# Patient Record
Sex: MALE | Race: WHITE | NOT HISPANIC OR LATINO | URBAN - METROPOLITAN AREA
[De-identification: names, ages, dates, MRNs, and addresses within clinical notes are randomized per-mention and may not be internally consistent; named-entity substitution may affect disease eponyms.]

---

## 2024-06-12 ENCOUNTER — INPATIENT (INPATIENT)
Facility: HOSPITAL | Age: 69
LOS: 0 days | Discharge: HOME | DRG: 252 | End: 2024-06-13
Attending: INTERNAL MEDICINE | Admitting: HOSPITALIST
Payer: MEDICARE

## 2024-06-12 VITALS
SYSTOLIC BLOOD PRESSURE: 194 MMHG | RESPIRATION RATE: 18 BRPM | TEMPERATURE: 99 F | OXYGEN SATURATION: 100 % | WEIGHT: 222.67 LBS | HEART RATE: 59 BPM | DIASTOLIC BLOOD PRESSURE: 84 MMHG

## 2024-06-12 DIAGNOSIS — T82.9XXA UNSPECIFIED COMPLICATION OF CARDIAC AND VASCULAR PROSTHETIC DEVICE, IMPLANT AND GRAFT, INITIAL ENCOUNTER: ICD-10-CM

## 2024-06-12 LAB
ALBUMIN SERPL ELPH-MCNC: 4 G/DL — SIGNIFICANT CHANGE UP (ref 3.5–5.2)
ALP SERPL-CCNC: 76 U/L — SIGNIFICANT CHANGE UP (ref 30–115)
ALT FLD-CCNC: 9 U/L — SIGNIFICANT CHANGE UP (ref 0–41)
ANION GAP SERPL CALC-SCNC: 16 MMOL/L — HIGH (ref 7–14)
APTT BLD: 31.3 SEC — SIGNIFICANT CHANGE UP (ref 27–39.2)
AST SERPL-CCNC: 14 U/L — SIGNIFICANT CHANGE UP (ref 0–41)
BASOPHILS # BLD AUTO: 0.06 K/UL — SIGNIFICANT CHANGE UP (ref 0–0.2)
BASOPHILS NFR BLD AUTO: 1.2 % — HIGH (ref 0–1)
BILIRUB SERPL-MCNC: 0.7 MG/DL — SIGNIFICANT CHANGE UP (ref 0.2–1.2)
BLD GP AB SCN SERPL QL: SIGNIFICANT CHANGE UP
BUN SERPL-MCNC: 68 MG/DL — CRITICAL HIGH (ref 10–20)
CALCIUM SERPL-MCNC: 8.5 MG/DL — SIGNIFICANT CHANGE UP (ref 8.4–10.4)
CHLORIDE SERPL-SCNC: 98 MMOL/L — SIGNIFICANT CHANGE UP (ref 98–110)
CO2 SERPL-SCNC: 28 MMOL/L — SIGNIFICANT CHANGE UP (ref 17–32)
CREAT SERPL-MCNC: 10 MG/DL — CRITICAL HIGH (ref 0.7–1.5)
EGFR: 5 ML/MIN/1.73M2 — LOW
EOSINOPHIL # BLD AUTO: 0.11 K/UL — SIGNIFICANT CHANGE UP (ref 0–0.7)
EOSINOPHIL NFR BLD AUTO: 2.2 % — SIGNIFICANT CHANGE UP (ref 0–8)
GLUCOSE SERPL-MCNC: 80 MG/DL — SIGNIFICANT CHANGE UP (ref 70–99)
HCT VFR BLD CALC: 30.6 % — LOW (ref 42–52)
HGB BLD-MCNC: 10.1 G/DL — LOW (ref 14–18)
IMM GRANULOCYTES NFR BLD AUTO: 0.2 % — SIGNIFICANT CHANGE UP (ref 0.1–0.3)
INR BLD: 1.01 RATIO — SIGNIFICANT CHANGE UP (ref 0.65–1.3)
LYMPHOCYTES # BLD AUTO: 1.07 K/UL — LOW (ref 1.2–3.4)
LYMPHOCYTES # BLD AUTO: 21.9 % — SIGNIFICANT CHANGE UP (ref 20.5–51.1)
MCHC RBC-ENTMCNC: 32.9 PG — HIGH (ref 27–31)
MCHC RBC-ENTMCNC: 33 G/DL — SIGNIFICANT CHANGE UP (ref 32–37)
MCV RBC AUTO: 99.7 FL — HIGH (ref 80–94)
MONOCYTES # BLD AUTO: 0.34 K/UL — SIGNIFICANT CHANGE UP (ref 0.1–0.6)
MONOCYTES NFR BLD AUTO: 7 % — SIGNIFICANT CHANGE UP (ref 1.7–9.3)
NEUTROPHILS # BLD AUTO: 3.3 K/UL — SIGNIFICANT CHANGE UP (ref 1.4–6.5)
NEUTROPHILS NFR BLD AUTO: 67.5 % — SIGNIFICANT CHANGE UP (ref 42.2–75.2)
NRBC # BLD: 0 /100 WBCS — SIGNIFICANT CHANGE UP (ref 0–0)
PLATELET # BLD AUTO: 180 K/UL — SIGNIFICANT CHANGE UP (ref 130–400)
PMV BLD: 9.8 FL — SIGNIFICANT CHANGE UP (ref 7.4–10.4)
POTASSIUM SERPL-MCNC: 4.6 MMOL/L — SIGNIFICANT CHANGE UP (ref 3.5–5)
POTASSIUM SERPL-SCNC: 4.6 MMOL/L — SIGNIFICANT CHANGE UP (ref 3.5–5)
PROT SERPL-MCNC: 7.1 G/DL — SIGNIFICANT CHANGE UP (ref 6–8)
PROTHROM AB SERPL-ACNC: 11.5 SEC — SIGNIFICANT CHANGE UP (ref 9.95–12.87)
RBC # BLD: 3.07 M/UL — LOW (ref 4.7–6.1)
RBC # FLD: 13.8 % — SIGNIFICANT CHANGE UP (ref 11.5–14.5)
SODIUM SERPL-SCNC: 142 MMOL/L — SIGNIFICANT CHANGE UP (ref 135–146)
WBC # BLD: 4.89 K/UL — SIGNIFICANT CHANGE UP (ref 4.8–10.8)
WBC # FLD AUTO: 4.89 K/UL — SIGNIFICANT CHANGE UP (ref 4.8–10.8)

## 2024-06-12 PROCEDURE — 82728 ASSAY OF FERRITIN: CPT

## 2024-06-12 PROCEDURE — 36901 INTRO CATH DIALYSIS CIRCUIT: CPT

## 2024-06-12 PROCEDURE — 83540 ASSAY OF IRON: CPT

## 2024-06-12 PROCEDURE — 36907 BALO ANGIOP CTR DIALYSIS SEG: CPT

## 2024-06-12 PROCEDURE — 76937 US GUIDE VASCULAR ACCESS: CPT | Mod: 26

## 2024-06-12 PROCEDURE — 82607 VITAMIN B-12: CPT

## 2024-06-12 PROCEDURE — 76937 US GUIDE VASCULAR ACCESS: CPT

## 2024-06-12 PROCEDURE — 90935 HEMODIALYSIS ONE EVALUATION: CPT

## 2024-06-12 PROCEDURE — 99285 EMERGENCY DEPT VISIT HI MDM: CPT

## 2024-06-12 PROCEDURE — 36415 COLL VENOUS BLD VENIPUNCTURE: CPT

## 2024-06-12 PROCEDURE — 80053 COMPREHEN METABOLIC PANEL: CPT

## 2024-06-12 PROCEDURE — 83735 ASSAY OF MAGNESIUM: CPT

## 2024-06-12 PROCEDURE — 93931 UPPER EXTREMITY STUDY: CPT | Mod: 26,LT

## 2024-06-12 PROCEDURE — 93971 EXTREMITY STUDY: CPT | Mod: 26,LT

## 2024-06-12 PROCEDURE — 83550 IRON BINDING TEST: CPT

## 2024-06-12 PROCEDURE — 84100 ASSAY OF PHOSPHORUS: CPT

## 2024-06-12 PROCEDURE — 85027 COMPLETE CBC AUTOMATED: CPT

## 2024-06-12 PROCEDURE — 99223 1ST HOSP IP/OBS HIGH 75: CPT

## 2024-06-12 PROCEDURE — 82746 ASSAY OF FOLIC ACID SERUM: CPT

## 2024-06-12 RX ORDER — LANOLIN ALCOHOL/MO/W.PET/CERES
3 CREAM (GRAM) TOPICAL AT BEDTIME
Refills: 0 | Status: DISCONTINUED | OUTPATIENT
Start: 2024-06-12 | End: 2024-06-13

## 2024-06-12 RX ORDER — CALCIUM ACETATE 667 MG
3 TABLET ORAL
Refills: 0 | DISCHARGE

## 2024-06-12 RX ORDER — ONDANSETRON 8 MG/1
4 TABLET, FILM COATED ORAL EVERY 8 HOURS
Refills: 0 | Status: DISCONTINUED | OUTPATIENT
Start: 2024-06-12 | End: 2024-06-13

## 2024-06-12 RX ORDER — HEPARIN SODIUM 5000 [USP'U]/ML
5000 INJECTION INTRAVENOUS; SUBCUTANEOUS EVERY 12 HOURS
Refills: 0 | Status: DISCONTINUED | OUTPATIENT
Start: 2024-06-12 | End: 2024-06-13

## 2024-06-12 RX ORDER — CALCIUM ACETATE 667 MG
667 TABLET ORAL
Refills: 0 | Status: DISCONTINUED | OUTPATIENT
Start: 2024-06-12 | End: 2024-06-13

## 2024-06-12 RX ORDER — LISINOPRIL 2.5 MG/1
5 TABLET ORAL DAILY
Refills: 0 | Status: DISCONTINUED | OUTPATIENT
Start: 2024-06-12 | End: 2024-06-13

## 2024-06-12 RX ORDER — ACETAMINOPHEN 500 MG
650 TABLET ORAL EVERY 6 HOURS
Refills: 0 | Status: DISCONTINUED | OUTPATIENT
Start: 2024-06-12 | End: 2024-06-13

## 2024-06-12 RX ORDER — PANTOPRAZOLE SODIUM 20 MG/1
40 TABLET, DELAYED RELEASE ORAL
Refills: 0 | Status: DISCONTINUED | OUTPATIENT
Start: 2024-06-12 | End: 2024-06-13

## 2024-06-12 RX ORDER — LISINOPRIL 2.5 MG/1
1 TABLET ORAL
Refills: 0 | DISCHARGE

## 2024-06-12 RX ADMIN — LISINOPRIL 5 MILLIGRAM(S): 2.5 TABLET ORAL at 21:48

## 2024-06-12 NOTE — ED PROVIDER NOTE - OBJECTIVE STATEMENT
67 yo male with a pmh of htn and esrd on HD MorrisKeely presents c/o L arm edema for the past week. pt states his last dialysis was on Saturday because they refused to perform HD yesterday due to the swelling. pt denies any other symptoms including fevers, chill, headache, recent illness/travel, cough, abdominal pain, chest pain, or SOB.

## 2024-06-12 NOTE — ED ADULT NURSE REASSESSMENT NOTE - NS ED NURSE REASSESS COMMENT FT1
Received patient from AM RN at 1900, comfort measures maintained, IV intact and patent, Call bell within reach. Patient pending transport to 3B/ 6B.

## 2024-06-12 NOTE — CHART NOTE - NSCHARTNOTEFT_GEN_A_CORE
PACU ANESTHESIA ADMISSION NOTE      Procedure: Fistulogram  Post op diagnosis:  AV Fistulogram    __x__  Patent Airway    __x__  Full return of protective reflexes    __x__  Full recovery from anesthesia / back to baseline status    Vitals:  T(C): 37.1 (06-12-24 @ 14:13), Max: 37.1 (06-12-24 @ 10:50)  HR: 59 (06-12-24 @ 14:13) (53 - 59)  BP: 194/84 (06-12-24 @ 14:13) (194/84 - 204/86)  RR: 18 (06-12-24 @ 14:13) (18 - 20)  SpO2: 100% (06-12-24 @ 14:13) (100% - 100%)    Mental Status:  __x__ Awake   ___x__ Alert   _____ Drowsy   _____ Sedated    Nausea/Vomiting:  __x__ NO  ______Yes,   See Post - Op Orders          Pain Scale (0-10):  ___0__    Treatment: ____ None    __x__ See Post - Op/PCA Orders    Post - Operative Fluids:   ____ Oral   __x__ See Post - Op Orders    Plan: Discharge:   __x__Home       _____Floor     _____Critical Care    _____  Other:_________________    Comments: Patient had smooth intraoperative event, no anesthesia complication.  PACU Vital signs: HR: 57            BP:        156/72          RR: 16            O2 Sat:       100%

## 2024-06-12 NOTE — PROCEDURE NOTE - PROCEDURE FINDINGS AND DETAILS
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 Successfully treated with 10 mm balloon angioplasty

## 2024-06-12 NOTE — ED PROVIDER NOTE - ATTENDING APP SHARED VISIT CONTRIBUTION OF CARE
68-year-old male with dialysis had fistula about 5 years ago at outside hospital who is presenting with gradual onset 6-day history of left arm swelling which started in the hand and now has progressed to the whole arm.  He has been able to get dialysis last Thursday and Saturday but was unable to get dialysis on Tuesday was referred here after his dialysis center consult to Dr. Oneal.  On exam there is a palpable thrill the pulses are intact cap refill is normal the arm is swollen to touch sensation is intact plan is to obtain vascular ultrasound.

## 2024-06-12 NOTE — H&P ADULT - ASSESSMENT
A 68Y/M with Pmhx of HTN, ESRD on HD(TTS, last HD on Saturday) presented to the ED for the evaluation of  L arm edema for 1 week. The swelling started in the hand and now has progressed to the entire Left UE. The patient missed his HD session on Tuesday due to the edema. The patient was referred to the ED after the dialysis center consulted Dr. Oneal The patient had fistula creation about 5 years ago.       #ESRD on HD(TTS)  #Macrocytic Anemia  -Last HD on Saturday  -Hb 10.1, MCV 99.7, BUN 68, Cr 10  -VA Duplex Upper Ext Vein Scan, Left (06.12.24 @ 14:06): No evidence of left upper extremity deep venous thrombosis. Negative for superficial thrombophlebitis. Left upper extremity AV fistula noted.  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     Plan:  -AV fistula is ready for immediate use as per IR  -Nephro consult  -Phosphorus Level in AM  -Anemia work up    #Hypertensive Urgency in the setting of missed HD session  #H/O HTN  -BP at presentation 194/84mmhg, repeat 156/72mmhg  -Will continue with   -Monitor BP      #MISC:  -GI ppx: PPI  -DVT ppx: Heparin sq  -Diet: DASH/Renal  -Activity: IAT  -Dispo: Admit to medicine, Nephro consult         A 68Y/M with Pmhx of HTN, ESRD on HD(TTS, last HD on Saturday) presented to the ED for the evaluation of  L arm edema for 1 week. The swelling started in the hand and now has progressed to the entire Left UE. The patient missed his HD session on Tuesday due to the edema. The patient was referred to the ED after the dialysis center consulted Dr. Oneal .The patient had fistula creation about 5 years ago.       #ESRD on HD(TTS)  #Macrocytic Anemia  -Last HD on Saturday  -still makes small amount of urine   -Hb 10.1, MCV 99.7, BUN 68, Cr 10  -VA Duplex Upper Ext Vein Scan, Left (06.12.24 @ 14:06): No evidence of left upper extremity deep venous thrombosis. Negative for superficial thrombophlebitis. Left upper extremity AV fistula noted.  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     Plan:  -AV fistula is ready for immediate use as per IR  -Nephro consult(Dr. Snyder)  -Phosphorus Level in AM  -Anemia work up    #Hypertensive Urgency in the setting of missed HD session  #H/O HTN  -BP at presentation 194/84mmhg, repeat 156/72mmhg  -Will continue with home dose Lisinopril 5mg   -Monitor BP      #MISC:  -GI ppx: PPI  -DVT ppx: Heparin sq  -Diet: DASH/Renal  -Activity: IAT  -Dispo: Admit to medicine, Nephro consult

## 2024-06-12 NOTE — H&P ADULT - NSHPLABSRESULTS_GEN_ALL_CORE
LABS:                         10.1   4.89  )-----------( 180      ( 12 Jun 2024 12:36 )             30.6     06-12    142  |  98  |  68<HH>  ----------------------------<  80  4.6   |  28  |  10.0<HH>    Ca    8.5      12 Jun 2024 12:36    TPro  7.1  /  Alb  4.0  /  TBili  0.7  /  DBili  x   /  AST  14  /  ALT  9   /  AlkPhos  76  06-12    PT/INR - ( 12 Jun 2024 12:36 )   PT: 11.50 sec;   INR: 1.01 ratio         PTT - ( 12 Jun 2024 12:36 )  PTT:31.3 sec  Urinalysis Basic - ( 12 Jun 2024 12:36 )    Color: x / Appearance: x / SG: x / pH: x  Gluc: 80 mg/dL / Ketone: x  / Bili: x / Urobili: x   Blood: x / Protein: x / Nitrite: x   Leuk Esterase: x / RBC: x / WBC x   Sq Epi: x / Non Sq Epi: x / Bacteria: x                RADIOLOGY, EKG & ADDITIONAL TESTS: Reviewed.

## 2024-06-12 NOTE — H&P ADULT - ATTENDING COMMENTS
68Y/M with Pmhx of HTN, ESRD on HD(TTS, last HD on Saturday) presented to the ED for the evaluation of  L arm edema for 1 week. The swelling started in the hand and now has progressed to the entire Left UE. The patient missed his HD session on Tuesday due to the edema. The patient was referred to the ED after the dialysis center consulted Dr. Oneal .The patient had fistula creation about 5 years ago.     Agree  with assessment  except for changes below.   Vital Signs Last 24 Hrs  T(C): 36.1 (12 Jun 2024 16:00), Max: 37.1 (12 Jun 2024 10:50)  T(F): 97 (12 Jun 2024 16:00), Max: 98.7 (12 Jun 2024 10:50)  HR: 58 (12 Jun 2024 16:40) (52 - 59)  BP: 132/70 (12 Jun 2024 16:40) (132/70 - 204/86)  BP(mean): --  RR: 18 (12 Jun 2024 16:40) (18 - 20)  SpO2: 99% (12 Jun 2024 16:40) (99% - 100%)    Parameters below as of 12 Jun 2024 16:40  Patient On (Oxygen Delivery Method): room air      VA Duplex Upper Ext Vein Scan, Left (06.12.24 @ 14:06): No evidence of left upper extremity deep venous thrombosis. Negative for superficial thrombophlebitis. Left upper extremity AV fistula noted.    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      AV fistula is ready for immediate use as per IR  -Nephro consult(Dr. Snyder)  -Phosphorus Level in AM  -f/u Anemia work up    Hypertensive Urgency in the setting of missed HD session- Improved   Hx  HTN  -BP at presentation 194/84mmhg, repeat 156/72mmhg  -Continue Lisinopril 5mg   -Monitor BP    Seen on 06/12

## 2024-06-12 NOTE — ED ADULT NURSE REASSESSMENT NOTE - NS ED NURSE REASSESS COMMENT FT1
pt was sent to Vascular @ [id number] , contacted vascular US to get update on pt Vasc Tech stated that pt was picked up by IR MD to go to IR, advised MD Ruvalcaba and RAJNI Mari

## 2024-06-12 NOTE — PATIENT PROFILE ADULT - FALL HARM RISK - HARM RISK INTERVENTIONS

## 2024-06-12 NOTE — H&P ADULT - HISTORY OF PRESENT ILLNESS
A 68Y/M with Pmhx of HTN, ESRD on HD(TTS, last HD on Saturday) presented to the ED for the evaluation of  L arm edema for 1 week. The swelling started in the hand and now has progressed to the entire Left UE. The patient missed his HD session on Tuesday due to the edema. The patient was referred to the ED after the dialysis center consulted Dr. Oneal The patient had fistula creation about 5 years ago. Denied fevers, chill, headache, cough, SOB, recent illness, recent travel, abd pain, dysuria, N/V/D, sick contacts, dizziness, headache or LOC.    #ED Vitals:  T(C): 36.1 (06-12-24 @ 16:00), Max: 37.1 (06-12-24 @ 10:50)  HR: 58 (06-12-24 @ 16:40) (52 - 59)  BP: 132/70 (06-12-24 @ 16:40) (132/70 - 204/86)  RR: 18 (06-12-24 @ 16:40) (18 - 20)  SpO2: 99% (06-12-24 @ 16:40) (99% - 100%)    #Labs: Hb 10.1, MCV 99.7, BUN 68, Cr 10    #Imaging:  VA Duplex Upper Ext Vein Scan, Left (06.12.24 @ 14:06):   No evidence of left upper extremity deep venous thrombosis. Negative for superficial thrombophlebitis. Left upper extremity AV fistula noted.    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     Admitted for further management.        A 68Y/M with Pmhx of HTN, ESRD on HD(TTS, last HD on Saturday) presented to the ED for the evaluation of  L arm edema for 1 week. The swelling started in the hand and now has progressed to the entire Left UE. The patient missed his HD session on Tuesday due to the edema. The patient was referred to the ED after the dialysis center consulted Dr. Oneal. The patient had fistula creation about 5 years ago. Denied fevers, chill, headache, cough, SOB, recent illness, recent travel, abd pain, dysuria, N/V/D, sick contacts, dizziness, headache or LOC.    #ED Vitals:  T(C): 36.1 (06-12-24 @ 16:00), Max: 37.1 (06-12-24 @ 10:50)  HR: 58 (06-12-24 @ 16:40) (52 - 59)  BP: 132/70 (06-12-24 @ 16:40) (132/70 - 204/86)  RR: 18 (06-12-24 @ 16:40) (18 - 20)  SpO2: 99% (06-12-24 @ 16:40) (99% - 100%)    #Labs: Hb 10.1, MCV 99.7, BUN 68, Cr 10    #Imaging:  VA Duplex Upper Ext Vein Scan, Left (06.12.24 @ 14:06):   No evidence of left upper extremity deep venous thrombosis. Negative for superficial thrombophlebitis. Left upper extremity AV fistula noted.    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     Admitted for further management.

## 2024-06-12 NOTE — ED ADULT NURSE NOTE - NSFALLUNIVINTERV_ED_ALL_ED
Bed/Stretcher in lowest position, wheels locked, appropriate side rails in place/Call bell, personal items and telephone in reach/Instruct patient to call for assistance before getting out of bed/chair/stretcher/Non-slip footwear applied when patient is off stretcher/[place] to call system/Physically safe environment - no spills, clutter or unnecessary equipment/Purposeful proactive rounding/Room/bathroom lighting operational, light cord in reach

## 2024-06-12 NOTE — ED PROVIDER NOTE - CLINICAL SUMMARY MEDICAL DECISION MAKING FREE TEXT BOX
patient admitted for fistulogram with IR consult for LUE swelling. electrolytes stable. no signs of volume overload.

## 2024-06-12 NOTE — ED PROVIDER NOTE - RHYTHM STRIP/ULTRASOUND IMAGES/CT SCAN IMAGES SHOWED
Independent interpretation of the US as a preliminary reading by Dr. Oswaldo Coffman shows Independent interpretation of the US as a preliminary reading by Dr. Oswaldo Coffman shows no VTE.

## 2024-06-12 NOTE — PROCEDURE NOTE - PLAN
AV fistula is ready for immediate use.     Patient may be discharged from an Interventional Radiology standpoint

## 2024-06-12 NOTE — H&P ADULT - NSHPPHYSICALEXAM_GEN_ALL_CORE
CONSTITUTIONAL: Well groomed, no apparent distress  EYES: PERRLA and symmetric, EOMI, No conjunctival or scleral injection, non-icteric  ENMT: Oral mucosa with moist membranes.   RESP: No respiratory distress, no use of accessory muscles; CTA b/l, no WRR  CV: RRR, +S1S2, no MRG; no JVD; a palpable thrill over the fistula site  GI: Soft, NT, ND, no rebound, no guarding  LYMPH: No cervical LAD or tenderness  MSK: LUE edema, non tender  SKIN: No rashes or ulcers noted; no subcutaneous nodules or induration palpable  NEURO: CN II-XII intact; normal reflexes in upper and lower extremities, sensation intact in upper and lower extremities b/l to light touch   PSYCH: Appropriate insight/judgment; A+O x 3, mood and affect appropriate, recent/remote memory intact

## 2024-06-12 NOTE — ED ADULT TRIAGE NOTE - CHIEF COMPLAINT QUOTE
"I have a left arm fistula. a couple weeks ago I noticed my hand was swollen, and Thursday they saw my arm swollen. I get dialysis done Tuesday Thursday Saturday . I haven't had dialysis since Saturday "

## 2024-06-12 NOTE — ED PROVIDER NOTE - PHYSICAL EXAMINATION
Gen: NAD, AOx3  Head: NCAT  HEENT: PERRL, oral mucosa moist, normal conjunctiva, oropharynx clear without exudate or erythema  Lung: CTAB, no respiratory distress, no wheezing, rales, rhonchi  CV: normal s1/s2, rrr, Normal perfusion, pulses 2+ throughout  Abd: soft, NTND, no CVA tenderness  Genitourinary: no pelvic tenderness  MSK: LUE edema, no visible deformities, full range of motion in all 4 extremities  Neuro: CN II-XII grossly intact, No focal neurologic deficits  Skin: No rash   Psych: normal affect

## 2024-06-13 VITALS
HEART RATE: 55 BPM | SYSTOLIC BLOOD PRESSURE: 179 MMHG | DIASTOLIC BLOOD PRESSURE: 92 MMHG | TEMPERATURE: 99 F | OXYGEN SATURATION: 97 % | RESPIRATION RATE: 18 BRPM

## 2024-06-13 LAB
ALBUMIN SERPL ELPH-MCNC: 4 G/DL — SIGNIFICANT CHANGE UP (ref 3.5–5.2)
ALP SERPL-CCNC: 70 U/L — SIGNIFICANT CHANGE UP (ref 30–115)
ALT FLD-CCNC: 9 U/L — SIGNIFICANT CHANGE UP (ref 0–41)
ANION GAP SERPL CALC-SCNC: 17 MMOL/L — HIGH (ref 7–14)
AST SERPL-CCNC: 11 U/L — SIGNIFICANT CHANGE UP (ref 0–41)
BILIRUB SERPL-MCNC: 0.6 MG/DL — SIGNIFICANT CHANGE UP (ref 0.2–1.2)
BUN SERPL-MCNC: 75 MG/DL — CRITICAL HIGH (ref 10–20)
CALCIUM SERPL-MCNC: 8.2 MG/DL — LOW (ref 8.4–10.5)
CHLORIDE SERPL-SCNC: 97 MMOL/L — LOW (ref 98–110)
CO2 SERPL-SCNC: 27 MMOL/L — SIGNIFICANT CHANGE UP (ref 17–32)
CREAT SERPL-MCNC: 11.3 MG/DL — CRITICAL HIGH (ref 0.7–1.5)
EGFR: 4 ML/MIN/1.73M2 — LOW
FERRITIN SERPL-MCNC: 1262 NG/ML — HIGH (ref 30–400)
FOLATE SERPL-MCNC: 6.5 NG/ML — SIGNIFICANT CHANGE UP
GLUCOSE SERPL-MCNC: 78 MG/DL — SIGNIFICANT CHANGE UP (ref 70–99)
HCT VFR BLD CALC: 29.1 % — LOW (ref 42–52)
HGB BLD-MCNC: 9.7 G/DL — LOW (ref 14–18)
IRON SATN MFR SERPL: 41 % — SIGNIFICANT CHANGE UP (ref 15–50)
IRON SATN MFR SERPL: 68 UG/DL — SIGNIFICANT CHANGE UP (ref 35–150)
MAGNESIUM SERPL-MCNC: 2.7 MG/DL — HIGH (ref 1.8–2.4)
MCHC RBC-ENTMCNC: 32.9 PG — HIGH (ref 27–31)
MCHC RBC-ENTMCNC: 33.3 G/DL — SIGNIFICANT CHANGE UP (ref 32–37)
MCV RBC AUTO: 98.6 FL — HIGH (ref 80–94)
NRBC # BLD: 0 /100 WBCS — SIGNIFICANT CHANGE UP (ref 0–0)
PHOSPHATE SERPL-MCNC: 5.8 MG/DL — HIGH (ref 2.1–4.9)
PLATELET # BLD AUTO: 178 K/UL — SIGNIFICANT CHANGE UP (ref 130–400)
PMV BLD: 10.1 FL — SIGNIFICANT CHANGE UP (ref 7.4–10.4)
POTASSIUM SERPL-MCNC: 4.8 MMOL/L — SIGNIFICANT CHANGE UP (ref 3.5–5)
POTASSIUM SERPL-SCNC: 4.8 MMOL/L — SIGNIFICANT CHANGE UP (ref 3.5–5)
PROT SERPL-MCNC: 7 G/DL — SIGNIFICANT CHANGE UP (ref 6–8)
RBC # BLD: 2.95 M/UL — LOW (ref 4.7–6.1)
RBC # FLD: 13.6 % — SIGNIFICANT CHANGE UP (ref 11.5–14.5)
SODIUM SERPL-SCNC: 141 MMOL/L — SIGNIFICANT CHANGE UP (ref 135–146)
TIBC SERPL-MCNC: 167 UG/DL — LOW (ref 220–430)
UIBC SERPL-MCNC: 99 UG/DL — LOW (ref 110–370)
VIT B12 SERPL-MCNC: 534 PG/ML — SIGNIFICANT CHANGE UP (ref 232–1245)
WBC # BLD: 6.68 K/UL — SIGNIFICANT CHANGE UP (ref 4.8–10.8)
WBC # FLD AUTO: 6.68 K/UL — SIGNIFICANT CHANGE UP (ref 4.8–10.8)

## 2024-06-13 PROCEDURE — 99239 HOSP IP/OBS DSCHRG MGMT >30: CPT

## 2024-06-13 RX ORDER — CHLORHEXIDINE GLUCONATE 213 G/1000ML
1 SOLUTION TOPICAL
Refills: 0 | Status: DISCONTINUED | OUTPATIENT
Start: 2024-06-13 | End: 2024-06-13

## 2024-06-13 RX ADMIN — Medication 667 MILLIGRAM(S): at 08:42

## 2024-06-13 RX ADMIN — Medication 667 MILLIGRAM(S): at 17:23

## 2024-06-13 RX ADMIN — LISINOPRIL 5 MILLIGRAM(S): 2.5 TABLET ORAL at 05:35

## 2024-06-13 RX ADMIN — HEPARIN SODIUM 5000 UNIT(S): 5000 INJECTION INTRAVENOUS; SUBCUTANEOUS at 05:36

## 2024-06-13 RX ADMIN — Medication 667 MILLIGRAM(S): at 12:52

## 2024-06-13 RX ADMIN — PANTOPRAZOLE SODIUM 40 MILLIGRAM(S): 20 TABLET, DELAYED RELEASE ORAL at 05:35

## 2024-06-13 NOTE — DISCHARGE NOTE PROVIDER - CARE PROVIDER_API CALL
CANDY ARCHULETA  [address] [place] [address]  Phone: [phone]  Fax: [phone]  Follow Up Time: 2 weeks

## 2024-06-13 NOTE — CONSULT NOTE ADULT - SUBJECTIVE AND OBJECTIVE BOX
NEPHROLOGY CONSULTATION NOTE    THIS CONSULT IS INCOMPLETE / FULL CONSULT TO FOLLOW    Patient is a 68y Male whom presented to the hospital with     PAST MEDICAL & SURGICAL HISTORY:  HTN (hypertension)      ESRD on dialysis        Allergies:  penicillin (Unknown)    Home Medications Reviewed  Hospital Medications:   MEDICATIONS  (STANDING):  calcium acetate 667 milliGRAM(s) Oral three times a day with meals  heparin   Injectable 5000 Unit(s) SubCutaneous every 12 hours  lisinopril 5 milliGRAM(s) Oral daily  pantoprazole    Tablet 40 milliGRAM(s) Oral before breakfast      SOCIAL HISTORY:  Denies ETOH,Smoking,   FAMILY HISTORY:        REVIEW OF SYSTEMS:  CONSTITUTIONAL: No weakness, fevers or chills  EYES/ENT: No visual changes;  No vertigo or throat pain   NECK: No pain or stiffness  RESPIRATORY: No cough, wheezing, hemoptysis; No shortness of breath  CARDIOVASCULAR: No chest pain or palpitations.  GASTROINTESTINAL: No abdominal or epigastric pain. No nausea, vomiting, or hematemesis; No diarrhea or constipation. No melena or hematochezia.  GENITOURINARY: No dysuria, frequency, foamy urine, urinary urgency, incontinence or hematuria  NEUROLOGICAL: No numbness or weakness  SKIN: No itching, burning, rashes, or lesions   VASCULAR: No bilateral lower extremity edema.   All other review of systems is negative unless indicated above.    VITALS:  T(F): 97.3 (06-13-24 @ 06:06), Max: 98.7 (06-12-24 @ 10:50)  HR: 59 (06-13-24 @ 06:06)  BP: 151/80 (06-13-24 @ 06:06)  RR: 18 (06-13-24 @ 06:06)  SpO2: 99% (06-13-24 @ 06:06)      Weight (kg): 101 (06-12 @ 14:13)    I&O's Detail        PHYSICAL EXAM:  Constitutional: NAD  HEENT: anicteric sclera, oropharynx clear, MMM  Neck: No JVD  Respiratory: CTAB, no wheezes, rales or rhonchi  Cardiovascular: S1, S2, RRR  Gastrointestinal: BS+, soft, NT/ND  Extremities: No cyanosis or clubbing. No peripheral edema  Neurological: A/O x 3, no focal deficits  Psychiatric: Normal mood, normal affect  : No CVA tenderness. No low.   Skin: No rashes  Vascular Access:    LABS:  06-12    142  |  98  |  68<HH>  ----------------------------<  80  4.6   |  28  |  10.0<HH>    Ca    8.5      12 Jun 2024 12:36    TPro  7.1  /  Alb  4.0  /  TBili  0.7  /  DBili      /  AST  14  /  ALT  9   /  AlkPhos  76  06-12    Creatinine Trend: 10.0 <--                        10.1   4.89  )-----------( 180      ( 12 Jun 2024 12:36 )             30.6     Urine Studies:  Urinalysis Basic - ( 12 Jun 2024 12:36 )    Color:  / Appearance:  / SG:  / pH:   Gluc: 80 mg/dL / Ketone:   / Bili:  / Urobili:    Blood:  / Protein:  / Nitrite:    Leuk Esterase:  / RBC:  / WBC    Sq Epi:  / Non Sq Epi:  / Bacteria:                     RADIOLOGY & ADDITIONAL STUDIES:                 NEPHROLOGY CONSULTATION NOTE    A 68Y/M with Pmhx of HTN, ESRD on HD(TTS, last HD on Saturday) presented to the ED for the evaluation of  L arm edema for 1 week. The swelling started in the hand and now has progressed to the entire Left UE. The patient missed his HD session on Tuesday due to the edema. The patient was referred to the ED after the dialysis center consulted Dr. Oneal. The patient had fistula creation about 5 years ago. Denied fevers, chill, headache, cough, SOB, recent illness, recent travel, abd pain, dysuria, N/V/D, sick contacts, dizziness, headache or LOC.  sp fistulogram and clearing of stenosis by IR   AVF ready to be used   Seen today denied chest pain no SOB no abdominal pain no nausea no vomiting no other complaints       PAST MEDICAL & SURGICAL HISTORY:  HTN (hypertension)  ESRD on dialysis        Allergies:  penicillin (Unknown)    Home Medications Reviewed  Hospital Medications:   MEDICATIONS  (STANDING):  calcium acetate 667 milliGRAM(s) Oral three times a day with meals  heparin   Injectable 5000 Unit(s) SubCutaneous every 12 hours  lisinopril 5 milliGRAM(s) Oral daily  pantoprazole    Tablet 40 milliGRAM(s) Oral before breakfast      SOCIAL HISTORY:  Denies ETOH,Smoking,   FAMILY HISTORY:        REVIEW OF SYSTEMS:  All other review of systems is negative unless indicated above.    VITALS:  T(F): 97.3 (06-13-24 @ 06:06), Max: 98.7 (06-12-24 @ 10:50)  HR: 59 (06-13-24 @ 06:06)  BP: 151/80 (06-13-24 @ 06:06)  RR: 18 (06-13-24 @ 06:06)  SpO2: 99% (06-13-24 @ 06:06)      Weight (kg): 101 (06-12 @ 14:13)    I&O's Detail        PHYSICAL EXAM:  Constitutional: NAD  HEENT: anicteric sclera, oropharynx clear, MMM  Neck: No JVD  Respiratory: CTAB, no wheezes, rales or rhonchi  Cardiovascular: S1, S2, RRR  Gastrointestinal: BS+, soft, NT/ND  Extremities: No cyanosis or clubbing. No peripheral edema  Neurological: A/O x 3, no focal deficits    LABS:  06-13    141  |  97<L>  |  75<HH>  ----------------------------<  78  4.8   |  27  |  11.3<HH>    Ca    8.2<L>      13 Jun 2024 07:27  Phos  5.8     06-13  Mg     2.7     06-13    TPro  7.0  /  Alb  4.0  /  TBili  0.6  /  DBili  x   /  AST  11  /  ALT  9   /  AlkPhos  70  06-13    06-12    142  |  98  |  68<HH>  ----------------------------<  80  4.6   |  28  |  10.0<HH>    Ca    8.5      12 Jun 2024 12:36    TPro  7.1  /  Alb  4.0  /  TBili  0.7  /  DBili      /  AST  14  /  ALT  9   /  AlkPhos  76  06-12    Creatinine Trend: 10.0 <--                        10.1   4.89  )-----------( 180      ( 12 Jun 2024 12:36 )             30.6     Urine Studies:  Urinalysis Basic - ( 12 Jun 2024 12:36 )    Color:  / Appearance:  / SG:  / pH:   Gluc: 80 mg/dL / Ketone:   / Bili:  / Urobili:    Blood:  / Protein:  / Nitrite:    Leuk Esterase:  / RBC:  / WBC    Sq Epi:  / Non Sq Epi:  / Bacteria:                     RADIOLOGY & ADDITIONAL STUDIES:

## 2024-06-13 NOTE — DISCHARGE NOTE PROVIDER - NSDCCPCAREPLAN_GEN_ALL_CORE_FT
PRINCIPAL DISCHARGE DIAGNOSIS  Diagnosis: Complication of AV dialysis fistula  Assessment and Plan of Treatment: you were  admitted for left upper extremity edema . An angiogram was done and showed a stenosis in the left upper extremity subclavian vein and was dilated by a 10 mm stent .    "Arteriovenous" ("AV") means connecting an artery to a vein. "Vascular" means related to the blood vessels. "Access" means a way to get in.  An AV vascular access is a way for blood to leave and return to your body during hemodialysis. Your doctor will do surgery to create an access under your skin, usually in the lower part of your arm. An access needs time to heal before it can be used.  There are different types of AV access:  ?AV fistula – Most people get this type of access . To make this access, a doctor does surgery to connect an artery directly to a vein. An AV fistula needs to heal for 2 to 4 months or more before it can be used for dialysis.  ?AV graft – To make this type of access, a doctor uses a plastic tube to connect an artery to a vein . An AV graft usually needs to heal for 2 weeks before it can be used for dialysis. Some can be used after a couple of days.

## 2024-06-13 NOTE — DISCHARGE NOTE NURSING/CASE MANAGEMENT/SOCIAL WORK - PATIENT PORTAL LINK FT
You can access the FollowMyHealth Patient Portal offered by [facility] by registering at the following website: [email or web address]. By joining [facility]’s FollowMyHealth portal, you will also be able to view your health information using other applications (apps) compatible with our system.

## 2024-06-13 NOTE — CONSULT NOTE ADULT - ASSESSMENT
A 68Y/M with Pmhx of HTN, ESRD on HD(TTS, last HD on Saturday) presented to the ED for the evaluation of  L arm edema for 1 week. The swelling started in the hand and now has progressed to the entire Left UE    # ESRD on HD T Th Sat last HD Sat  # AVF stent clotting sp declotting     - for HD today 3h opti 160 UF 3l if tolerates   - IP noted on binders keep current   - if stable after HD can proceed with DC planning   - HTN resume home meds     will follow

## 2024-06-13 NOTE — DISCHARGE NOTE PROVIDER - NSDCMRMEDTOKEN_GEN_ALL_CORE_FT
calcium acetate 667 mg oral tablet: 3 tab(s) orally 3 times a day  lisinopril 5 mg oral tablet: 1 tab(s) orally once a day

## 2024-06-13 NOTE — PROGRESS NOTE ADULT - ASSESSMENT
A 67YO Man with Pmhx of HTN, ESRD on HD (TTS, last HD on Saturday) presented to the ED for the evaluation of Lt arm edema for 1 month. The swelling started in the hand and now has progressed to the entire Left UE. The patient missed his HD session on Tuesday due to the edema. The patient was referred to the ED after the dialysis center consulted Dr. Oneal. The patient had fistula creation about 5 years ago.     # lt arm swelling 2/2 lt subclav vein in stent stenosis   IR noted - s/p balloon angio  AVF is patent and working  MCE A & V Mike noted    # ESRD on HD(TTS); Macrocytic Anemia  renal noted: HD today -> if OK, then home  still makes small amount of urine   phos 5.8 - c/w phoslo 667 qac  Anemia work up as outpt    # HTN  should improve w/ HD  c/w lisinopril 5mg po q24    # GI ppx: PPI po q24    # DVT ppx: Heparin sq q12    # Diet: DASH/Renal    # Activity: Independent     Dispo: f/u renal re HD; tx BP;   today, pt will go home w/ no needs - stable for d/c p HD

## 2024-06-13 NOTE — PROGRESS NOTE ADULT - SUBJECTIVE AND OBJECTIVE BOX
YAMILKA TOMLIN  68y  Male  ***My note supersedes ALL resident notes that I sign.  My corrections for their notes are in my note.***    I can be reached directly on [email or web address]. My office number is [phone]. My personal cell number is [phone].    INTERVAL EVENTS: Here for f/u of lt arm swelling x1 mo. Pt s/p IR intervention in lt subclav vein instent stenosis. Arm is still swollen - pt has arm somewhat elevated (needs better elevation). No pain in arm. Has good use of lt hand. Of note, rt hand is also a little swollen, but rt arm is nl. Pt has no complaints. Pt would like to go home.    T(F): 98.8 (06-13-24 @ 11:46), Max: 98.8 (06-13-24 @ 11:46)  HR: 55 (06-13-24 @ 11:46) (52 - 59)  BP: 179/92 (06-13-24 @ 11:46) (132/70 - 194/84)  RR: 18 (06-13-24 @ 11:46) (18 - 18)  SpO2: 94% (06-13-24 @ 11:46) (94% - 100%)    Gen: NAD  HEENT: PERRL, EOMI, mouth clr, nose clr  Neck: no nodes, no JVD, thyroid nl  lungs: clr  hrt: s1 s2 rrr no murmur  abd: soft, NT/ND, no HS megaly  ext: no edema in legs, no c/c; rt hand minor swelling; 3+ swelling lat arm (shoulder to fingers); LUE AVF w/ bruit/thrill  neuro: aa ox3, cn intact, can move all 4 ext    LABS:                      9.7     (    98.6   6.68  )-----------( ---------      178      ( 13 Jun 2024 07:27 )             29.1    (    13.6     141   (   97   (   78      06-13-24 @ 07:27  ----------------------               4.8   (   27   (   75                             -----                        11.3  Ca  8.2   Mg  2.7    P   5.8     LFT  7.0  (  0.6  (  11 06-13-24 @ 07:27  -------------------------  4.0  (  70  (  9    PT/INR - ( 12 Jun 2024 12:36 )   PT: 11.50 sec;   INR: 1.01 ratio    PTT - ( 12 Jun 2024 12:36 )  PTT: 31.3 sec    Urinalysis Basic - ( 13 Jun 2024 07:27 )    Color: x / Appearance: x / SG: x / pH: x  Gluc: 78 mg/dL / Ketone: x  / Bili: x / Urobili: x   Blood: x / Protein: x / Nitrite: x   Leuk Esterase: x / RBC: x / WBC x   Sq Epi: x / Non Sq Epi: x / Bacteria: x    RADIOLOGY & ADDITIONAL TESTS:  < from: VA Duplex Upper Extrem Arterial Limited, Left (06.12.24 @ 14:08) >  Impression: 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    < end of copied text >    < from: VA Duplex Upper Ext Vein Scan, Left (06.12.24 @ 14:06) >  IMPRESSION:  No evidence of left upper extremity deep venous thrombosis.  Negative for superficial thrombophlebitis. Left upper extremity AV fistula noted.    < end of copied text >    MEDICATIONS:    acetaminophen     Tablet .. 650 milliGRAM(s) Oral every 6 hours PRN  aluminum hydroxide/magnesium hydroxide/simethicone Suspension 30 milliLiter(s) Oral every 4 hours PRN  calcium acetate 667 milliGRAM(s) Oral three times a day with meals  chlorhexidine 2% Cloths 1 Application(s) Topical <User Schedule>  heparin   Injectable 5000 Unit(s) SubCutaneous every 12 hours  lisinopril 5 milliGRAM(s) Oral daily  melatonin 3 milliGRAM(s) Oral at bedtime PRN  ondansetron Injectable 4 milliGRAM(s) IV Push every 8 hours PRN  pantoprazole    Tablet 40 milliGRAM(s) Oral before breakfast

## 2024-06-13 NOTE — DISCHARGE NOTE PROVIDER - HOSPITAL COURSE
A 68Y/M with Pmhx of HTN, ESRD on HD(TTS, last HD on Saturday) presented to the ED for the evaluation of  L arm edema for 1 week. The swelling started in the hand and now has progressed to the entire Left UE. The patient missed his HD session on Tuesday due to the edema. The patient was referred to the ED after the dialysis center consulted Dr. Oneal .The patient had fistula creation about 5 years ago.       #ESRD on HD(TTS)  #Macrocytic Anemia  -Last HD on Saturday  -still makes small amount of urine   -Hb 10.1, MCV 99.7, BUN 68, Cr 10  -VA Duplex Upper Ext Vein Scan, Left (06.12.24 @ 14:06): No evidence of left upper extremity deep venous thrombosis. Negative for superficial thrombophlebitis. Left upper extremity AV fistula noted.  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 Home lisinopril restarted     Patient had dialysis today     INCOMPLETE        A 68Y/M with Pmhx of HTN, ESRD on HD(TTS, last HD on Saturday) presented to the ED for the evaluation of  L arm edema for 1 week. The swelling started in the hand and now has progressed to the entire Left UE. The patient missed his HD session on Tuesday due to the edema. The patient was referred to the ED after the dialysis center consulted Dr. Oneal .The patient had fistula creation about 5 years ago.     Hospital Course:    -VA Duplex Upper Ext Vein Scan, Left (06.12.24 @ 14:06): No evidence of left upper extremity deep venous thrombosis. Negative for superficial thrombophlebitis. Left upper extremity AV fistula noted.  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  -Pt tolerated Hd 6/13/24 without complication  -Pt initially hypertensive to SBP 190s in ED, much improved during hospital stay without complication    Pt is stable for discharge home

## 2024-06-26 DIAGNOSIS — D63.1 ANEMIA IN CHRONIC KIDNEY DISEASE: ICD-10-CM

## 2024-06-26 DIAGNOSIS — T82.856A STENOSIS OF PERIPHERAL VASCULAR STENT, INITIAL ENCOUNTER: ICD-10-CM

## 2024-06-26 DIAGNOSIS — Z99.2 DEPENDENCE ON RENAL DIALYSIS: ICD-10-CM

## 2024-06-26 DIAGNOSIS — F17.210 NICOTINE DEPENDENCE, CIGARETTES, UNCOMPLICATED: ICD-10-CM

## 2024-06-26 DIAGNOSIS — N18.6 END STAGE RENAL DISEASE: ICD-10-CM

## 2024-06-26 DIAGNOSIS — I12.0 HYPERTENSIVE CHRONIC KIDNEY DISEASE WITH STAGE 5 CHRONIC KIDNEY DISEASE OR END STAGE RENAL DISEASE: ICD-10-CM
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
## 2024-07-16 PROBLEM — I10 ESSENTIAL (PRIMARY) HYPERTENSION: Chronic | Status: ACTIVE | Noted: 2024-06-12

## 2025-01-24 ENCOUNTER — TRANSCRIPTION ENCOUNTER (OUTPATIENT)
Age: 70
End: 2025-01-24

## 2025-01-24 ENCOUNTER — OUTPATIENT (OUTPATIENT)
Dept: OUTPATIENT SERVICES | Facility: HOSPITAL | Age: 70
LOS: 1 days | Discharge: HOME | End: 2025-01-24
Payer: MEDICARE

## 2025-01-24 VITALS
SYSTOLIC BLOOD PRESSURE: 137 MMHG | RESPIRATION RATE: 20 BRPM | WEIGHT: 211.64 LBS | DIASTOLIC BLOOD PRESSURE: 67 MMHG | TEMPERATURE: 97 F | HEART RATE: 57 BPM

## 2025-01-24 VITALS — HEART RATE: 61 BPM | SYSTOLIC BLOOD PRESSURE: 131 MMHG | RESPIRATION RATE: 20 BRPM | DIASTOLIC BLOOD PRESSURE: 77 MMHG

## 2025-01-24 DIAGNOSIS — Z98.890 OTHER SPECIFIED POSTPROCEDURAL STATES: Chronic | ICD-10-CM

## 2025-01-24 LAB
BASE EXCESS BLDV CALC-SCNC: 4.3 MMOL/L — HIGH (ref -2–3)
CA-I SERPL-SCNC: 1.33 MMOL/L — SIGNIFICANT CHANGE UP (ref 1.15–1.33)
GAS PNL BLDV: 137 MMOL/L — SIGNIFICANT CHANGE UP (ref 136–145)
GAS PNL BLDV: SIGNIFICANT CHANGE UP
GAS PNL BLDV: SIGNIFICANT CHANGE UP
HCO3 BLDV-SCNC: 33 MMOL/L — HIGH (ref 22–29)
HCT VFR BLDA CALC: 35 % — LOW (ref 39–51)
HGB BLD CALC-MCNC: 11.8 G/DL — LOW (ref 12.6–17.4)
LACTATE BLDV-MCNC: 1 MMOL/L — SIGNIFICANT CHANGE UP (ref 0.5–2)
PCO2 BLDV: 70 MMHG — CRITICAL HIGH (ref 42–55)
PH BLDV: 7.28 — LOW (ref 7.32–7.43)
PO2 BLDV: 23 MMHG — LOW (ref 25–45)
POTASSIUM BLDV-SCNC: 4 MMOL/L — SIGNIFICANT CHANGE UP (ref 3.5–5.1)
SAO2 % BLDV: 22.6 % — LOW (ref 67–88)

## 2025-01-24 PROCEDURE — C1874: CPT

## 2025-01-24 PROCEDURE — 85018 HEMOGLOBIN: CPT

## 2025-01-24 PROCEDURE — C1725: CPT

## 2025-01-24 PROCEDURE — C1894: CPT

## 2025-01-24 PROCEDURE — 36907 BALO ANGIOP CTR DIALYSIS SEG: CPT

## 2025-01-24 PROCEDURE — 82330 ASSAY OF CALCIUM: CPT

## 2025-01-24 PROCEDURE — C1887: CPT

## 2025-01-24 PROCEDURE — 85014 HEMATOCRIT: CPT

## 2025-01-24 PROCEDURE — 76937 US GUIDE VASCULAR ACCESS: CPT

## 2025-01-24 PROCEDURE — 83605 ASSAY OF LACTIC ACID: CPT

## 2025-01-24 PROCEDURE — 76937 US GUIDE VASCULAR ACCESS: CPT | Mod: 26

## 2025-01-24 PROCEDURE — 36902 INTRO CATH DIALYSIS CIRCUIT: CPT

## 2025-01-24 PROCEDURE — 84295 ASSAY OF SERUM SODIUM: CPT

## 2025-01-24 PROCEDURE — 82803 BLOOD GASES ANY COMBINATION: CPT

## 2025-01-24 PROCEDURE — C2623: CPT

## 2025-01-24 PROCEDURE — 84132 ASSAY OF SERUM POTASSIUM: CPT

## 2025-01-24 RX ORDER — CYCLOBENZAPRINE HCL 10 MG
10 TABLET ORAL
Refills: 0 | DISCHARGE

## 2025-01-24 RX ORDER — SODIUM CHLORIDE 9 MG/ML
1000 INJECTION, SOLUTION INTRAVENOUS
Refills: 0 | Status: DISCONTINUED | OUTPATIENT
Start: 2025-01-24 | End: 2025-01-24

## 2025-01-24 RX ORDER — LACTULOSE 10 G/15ML
10 SOLUTION ORAL; RECTAL
Refills: 0 | DISCHARGE

## 2025-01-24 RX ORDER — CALCIUM ACETATE 667 MG/1
667 CAPSULE ORAL
Refills: 0 | DISCHARGE

## 2025-01-24 RX ORDER — GABAPENTIN 300 MG/1
1 CAPSULE ORAL
Refills: 0 | DISCHARGE

## 2025-01-24 NOTE — ASU PATIENT PROFILE, ADULT - FALL HARM RISK - RISK INTERVENTIONS

## 2025-01-24 NOTE — ASU PATIENT PROFILE, ADULT - NSICDXPASTMEDICALHX_GEN_ALL_CORE_FT
Pt did attend community meeting this shift PAST MEDICAL HISTORY:  ESRD on dialysis M,W,F    HTN (hypertension)

## 2025-01-24 NOTE — ASU DISCHARGE PLAN (ADULT/PEDIATRIC) - FINANCIAL ASSISTANCE
[facility] provides services at a reduced cost to those who are determined to be eligible through [facility]’s financial assistance program. Information regarding [facility]’s financial assistance program can be found by going to [email or web address] or by calling [phone].

## 2025-01-24 NOTE — ASU PATIENT PROFILE, ADULT - NSICDXPASTSURGICALHX_GEN_ALL_CORE_FT
PAST SURGICAL HISTORY:  H/O neck surgery     Previous back surgery     Surgically constructed arteriovenous fistula 2020

## 2025-01-24 NOTE — PROCEDURE NOTE - PROCEDURE FINDINGS AND DETAILS
Left upper extremity AV fistulogram with balloon angioplasty and stent graft placement within the occluded left subclavian vein .
show

## 2025-01-27 PROBLEM — N18.6 END STAGE RENAL DISEASE: Chronic | Status: ACTIVE | Noted: 2024-06-12

## 2025-01-30 ENCOUNTER — APPOINTMENT (OUTPATIENT)
Dept: INTERVENTIONAL RADIOLOGY/VASCULAR | Facility: CLINIC | Age: 70
End: 2025-01-30

## 2025-02-25 NOTE — ED ADULT TRIAGE NOTE - HEIGHT IN FEET
Detail Level: Detailed Number Of Curettages: 3 Size Of Lesion In Cm: 0.8 Size Of Lesion After Curettage: 1 Add Intralesional Injection: No Anesthesia Type: 1% lidocaine with epinephrine Cautery Type: electrodesiccation What Was Performed First?: Curettage Final Size Statement: The size of the lesion after curettage was Additional Information: (Optional): The wound was cleaned, and a pressure dressing was applied.  The patient received detailed post-op instructions. Consent was obtained from the patient. The risks, benefits and alternatives to therapy were discussed in detail. Specifically, the risks of infection, scarring, bleeding, prolonged wound healing, nerve injury, incomplete removal, allergy to anesthesia and recurrence were addressed. Alternatives to ED&C, such as: surgical removal and XRT were also discussed.  Prior to the procedure, the treatment site was clearly identified and confirmed by the patient. All components of Universal Protocol/PAUSE Rule completed. Post-Care Instructions: I reviewed with the patient in detail post-care instructions. Patient is to keep the area dry for 48 hours, and not to engage in any swimming until the area is healed. Should the patient develop any fevers, chills, bleeding, severe pain patient will contact the office immediately. Bill As A Line Item Or As Units: Line Item Size Of Lesion In Cm: 1.1 Size Of Lesion After Curettage: 1.2 6

## 2025-06-24 ENCOUNTER — OUTPATIENT (OUTPATIENT)
Dept: OUTPATIENT SERVICES | Facility: HOSPITAL | Age: 70
LOS: 1 days | End: 2025-06-24
Payer: MEDICARE

## 2025-06-24 DIAGNOSIS — Z98.890 OTHER SPECIFIED POSTPROCEDURAL STATES: Chronic | ICD-10-CM

## 2025-06-24 DIAGNOSIS — N18.9 CHRONIC KIDNEY DISEASE, UNSPECIFIED: ICD-10-CM

## 2025-06-24 PROCEDURE — C1600: CPT

## 2025-06-24 PROCEDURE — 85018 HEMOGLOBIN: CPT

## 2025-06-24 PROCEDURE — 82803 BLOOD GASES ANY COMBINATION: CPT

## 2025-06-24 PROCEDURE — 36902 INTRO CATH DIALYSIS CIRCUIT: CPT

## 2025-06-24 PROCEDURE — 82330 ASSAY OF CALCIUM: CPT

## 2025-06-24 PROCEDURE — 84132 ASSAY OF SERUM POTASSIUM: CPT

## 2025-06-24 PROCEDURE — 83605 ASSAY OF LACTIC ACID: CPT

## 2025-06-24 PROCEDURE — C1725: CPT

## 2025-06-24 PROCEDURE — C1894: CPT

## 2025-06-24 PROCEDURE — C1769: CPT

## 2025-06-24 PROCEDURE — 85014 HEMATOCRIT: CPT

## 2025-06-24 PROCEDURE — C1887: CPT

## 2025-06-24 PROCEDURE — 76937 US GUIDE VASCULAR ACCESS: CPT

## 2025-06-24 PROCEDURE — 84295 ASSAY OF SERUM SODIUM: CPT

## 2025-07-24 ENCOUNTER — TRANSCRIPTION ENCOUNTER (OUTPATIENT)
Age: 70
End: 2025-07-24

## 2025-07-24 VITALS
TEMPERATURE: 98 F | SYSTOLIC BLOOD PRESSURE: 147 MMHG | RESPIRATION RATE: 18 BRPM | HEIGHT: 72 IN | DIASTOLIC BLOOD PRESSURE: 71 MMHG | HEART RATE: 59 BPM | WEIGHT: 220.02 LBS | OXYGEN SATURATION: 99 %

## 2025-07-24 VITALS
SYSTOLIC BLOOD PRESSURE: 126 MMHG | OXYGEN SATURATION: 100 % | HEART RATE: 56 BPM | RESPIRATION RATE: 18 BRPM | TEMPERATURE: 98 F | DIASTOLIC BLOOD PRESSURE: 66 MMHG

## 2025-07-24 LAB
BASE EXCESS BLDV CALC-SCNC: 4.5 MMOL/L — HIGH (ref -2–3)
CA-I SERPL-SCNC: 1.18 MMOL/L — SIGNIFICANT CHANGE UP (ref 1.15–1.33)
GAS PNL BLDV: 135 MMOL/L — LOW (ref 136–145)
GAS PNL BLDV: SIGNIFICANT CHANGE UP
GAS PNL BLDV: SIGNIFICANT CHANGE UP
HCO3 BLDV-SCNC: 31 MMOL/L — HIGH (ref 22–29)
HCT VFR BLDA CALC: 33 % — LOW (ref 39–51)
HGB BLD CALC-MCNC: 11 G/DL — LOW (ref 12.6–17.4)
LACTATE BLDV-MCNC: 0.6 MMOL/L — SIGNIFICANT CHANGE UP (ref 0.5–2)
PCO2 BLDV: 55 MMHG — SIGNIFICANT CHANGE UP (ref 42–55)
PH BLDV: 7.36 — SIGNIFICANT CHANGE UP (ref 7.32–7.43)
PO2 BLDV: 21 MMHG — LOW (ref 25–45)
POTASSIUM BLDV-SCNC: 4.4 MMOL/L — SIGNIFICANT CHANGE UP (ref 3.5–5.1)
SAO2 % BLDV: 34.5 % — LOW (ref 67–88)

## 2025-07-24 PROCEDURE — 76937 US GUIDE VASCULAR ACCESS: CPT | Mod: 26

## 2025-07-24 PROCEDURE — 36902 INTRO CATH DIALYSIS CIRCUIT: CPT

## 2025-07-24 NOTE — ASU DISCHARGE PLAN (ADULT/PEDIATRIC) - NS MD DC FALL RISK RISK
For information on Fall & Injury Prevention, visit: [email or web address] OR  [email or web address] OR  https://www.cdc.gov/steadi/patient.html

## 2025-07-24 NOTE — H&P ADULT - NSHPPHYSICALEXAM_GEN_ALL_CORE
CONSTITUTIONAL: Well groomed, no apparent distress  EYES: PERRLA and symmetric, EOMI, No conjunctival or scleral injection, non-icteric  RESP: No respiratory distress, no use of accessory muscles; CTA b/l, no WRR  CV: RRR, +S1S2, no MRG; no JVD; a palpable thrill over the fistula site  GI: Soft, NT, ND, no rebound, no guarding  LYMPH: No cervical LAD or tenderness  MSK: LUE edema, non tender  SKIN: No rashes or ulcers noted; no subcutaneous nodules or induration palpable  NEURO: CN II-XII intact; normal reflexes in upper and lower extremities, sensation intact in upper and lower extremities b/l to light touch  PSYCH: Appropriate insight/judgment; A+O x 3, mood and affect appropriate, recent/remote memory intact

## 2025-07-24 NOTE — ASU DISCHARGE PLAN (ADULT/PEDIATRIC) - FINANCIAL ASSISTANCE
[facility] provides services at a reduced cost to those who are determined to be eligible through [facility]’s financial assistance program. Information regarding [facility]’s financial assistance program can be found by going to [email or web address] or by calling [phone].

## 2025-07-24 NOTE — ASU PATIENT PROFILE, ADULT - FALL HARM RISK - RISK INTERVENTIONS

## 2025-07-24 NOTE — PROCEDURE NOTE - GENERAL PROCEDURE NAME
List of [facility] that can accommodate HD and *octreotide treatments/infusions is still pending review from facilities. The list will be provided to spouse when avail. 136pm  Choice list provided to spouse, Christiano Mensah discussed options with Polly and notified which locations have onsite HD and which would be transported out. Polly expressed understanding, will call SW with choice. 202pm  SW received call back from Christiano Mensah regarding rehab facilities. Polly expressed frustration with the options on the list. SW notified Polly that those were the options currently able to accept with beds available with a need for outpatient dialysis. Polly argued with SW that there has to be other options available as she reviewed on [facility]. SW notified Polly that referrals had been sent several times in the home zip code to the closest facilities and the facilities willing to accommodate patient have been provided. Polly reports that she is going to call around to find facilities that she is comfortable with as she is refusing to pick from list. JASMIN notified Christiano Mensah that a choice will need to be solidified as soon as possible as patient will be cleared to DC soon. There are currently 6 available options: [facility]      PLAN: [facility] list provided to spouse, awaiting choice. 50 referrals have been sent. Wife is refusing to choose from provided list.     *SW has provided all of the available options in the area that have beds available and that can accommodate patient's clinical needs.        KARL Newsome, [facility]    [id number] Left upper extremity AV fistulogram

## 2025-08-14 ENCOUNTER — APPOINTMENT (OUTPATIENT)
Dept: INTERVENTIONAL RADIOLOGY/VASCULAR | Facility: CLINIC | Age: 70
End: 2025-08-14